# Patient Record
(demographics unavailable — no encounter records)

---

## 2024-10-07 NOTE — HISTORY OF PRESENT ILLNESS
[FreeTextEntry1] : MELONY VAZQUEZ is a 66 year male who presents with luts/ pelvic pain. He complains of pelvic pain, nocturia, weak stream for many years. Mr. VAZQUEZ has been treated with iTIND with limited improvements. + gemtesa.   The patient is sexually active and has erectile dysfunction.  Here for VV. [Urinary Incontinence] : no urinary incontinence [Urinary Retention] : no urinary retention [Urinary Urgency] : no urinary urgency [Urinary Frequency] : urinary frequency [Nocturia] : nocturia [None] : None

## 2024-10-07 NOTE — ASSESSMENT
[FreeTextEntry1] : LUTs/ BPH- cysto/ trus - 55cc, + median lobe. cont current regimen of cialis/ gemtesa. hx of itind. reviewed role of repeat bph procedure. repeat cysto prior to any bph procedure.  pelvic pain - cont pelvic floor pt, trial valium suppositories.  PSA - wnl, 3.11 will monitor.

## 2025-02-13 NOTE — HISTORY OF PRESENT ILLNESS
[FreeTextEntry1] : MELONY VAZQUEZ is a 66 year male history of luts/ pelvic pain. He complains of pelvic pain, nocturia, weak stream for many years. Mr. VAZQUEZ has been treated with iTIND with limited improvements. + gemtesa. MR VAZQUEZ is now s/p optilume (12/2025)  The patient is sexually active and has erectile dysfunction/ pain with erection. MICHELLE-deferred  [Urinary Incontinence] : no urinary incontinence [Urinary Retention] : no urinary retention [Straining] : no straining [Weak Stream] : no weak stream [Intermittency] : no intermittency [Post-Void Dribbling] : no post-void dribbling

## 2025-02-13 NOTE — ASSESSMENT
[FreeTextEntry1] : Uroflow( BPH): low volume 144ml peak 16.3ml/s PVR( BPH) : 2ml    After a discussion of his urologic history and exam, and a review the urological issues,   1.LUTs/ BPH- cysto/ trus - 55cc, + median lobe. hx of itind and optilume- with improvements in flow. Pt still having some frequency and nocturia- pt ordered 5mg cialis daily-side effects reviewed  2.pelvic pain - cont pelvic floor pt  3.PSA - 3.11 (1/2024) - will repeat PSA next visit   4. Erectile Dysfunction-pt defers treatment at this time   The risks and benefits of treatment plan were discussed in detail with the patient who understands and wishes to proceed with plan. More than 50% was spent on counseling/coordination the care of the patient, regarding treatment options

## 2025-03-10 NOTE — ASSESSMENT
[FreeTextEntry1] :  After a discussion of his urologic history and exam, and a review the urological issues,  1.LUTs/ BPH- cysto/ trus - 55cc, + median lobe. hx of itind and optilume- significant improvements in flow. Pt still having some frequency and nocturia- cont 5mg cialis daily-side effects reviewed  2.pelvic pain - cont pelvic floor pt  3.PSA - 3.11 (1/2024) - will repeat PSA next visit  4. Erectile Dysfunction-pt defers treatment at this time  The risks and benefits of treatment plan were discussed in detail with the patient who understands and wishes to proceed with plan. More than 50% was spent on counseling/coordination the care of the patient, regarding treatment options.

## 2025-03-10 NOTE — HISTORY OF PRESENT ILLNESS
[FreeTextEntry1] : MELONY VAZQUEZ is a 66 year male who presents with luts/ pelvic pain. He complains of pelvic pain, nocturia x4, weak stream for many years. Mr. VAZQUEZ has been treated with optilume with limited improvements. + daily cialis   The patient is sexually active and has erectile dysfunction.  Here for VV converted to tc due to technical difficulties. [Urinary Urgency] : urinary urgency [Urinary Frequency] : urinary frequency [Nocturia] : nocturia

## 2025-03-31 NOTE — PHYSICAL EXAM
[General Appearance - Well Developed] : well developed [General Appearance - Well Nourished] : well nourished [] : no respiratory distress [Abdomen Soft] : soft [de-identified] : pt deferred

## 2025-03-31 NOTE — ASSESSMENT
[FreeTextEntry1] : Uroflow(BPH) PVR(BPH)  After a discussion of his urologic history and exam, and a review the urological issues,  1.LUTs/ BPH- cysto/ trus - 55cc, + median lobe. hx of itind and optilume- significant improvements in flow. Pt still having some frequency and nocturia- cont 5mg cialis daily-side effects reviewed  2.pelvic pain - cont pelvic floor pt  3.PSA - 3.11 (1/2024) - will repeat PSA next visit  4. Erectile Dysfunction-pt defers treatment at this time  The risks and benefits of treatment plan were discussed in detail with the patient who understands and wishes to proceed with plan. More than 50% was spent on counseling/coordination the care of the patient, regarding treatment options.

## 2025-03-31 NOTE — HISTORY OF PRESENT ILLNESS
[Urinary Frequency] : urinary frequency [Nocturia] : nocturia [Urinary Urgency] : no urinary urgency [FreeTextEntry1] : CC:BPH  MELONY VAZQUEZ is a 67 year male who presents with luts/ pelvic pain. He complains of pelvic pain, nocturia x4, weak stream for many years. Mr. VAZQUEZ has been treated with optilume with limited improvements. + daily cialis  The patient is sexually active and has erectile dysfunction.

## 2025-03-31 NOTE — PHYSICAL EXAM
[General Appearance - Well Developed] : well developed [General Appearance - Well Nourished] : well nourished [] : no respiratory distress [Abdomen Soft] : soft [de-identified] : pt deferred

## 2025-05-23 NOTE — PHYSICAL EXAM
[Chaperone Present] : A chaperone was present in the examining room during all aspects of the physical examination [FreeTextEntry2] : [place]

## 2025-05-23 NOTE — HISTORY OF PRESENT ILLNESS
[FreeTextEntry1] : Patient presents as referral from Dr. Bolanos.  Patient is a very pleasant 67-year-old male who presents with pelvic pain.  Also history of BPH status post iTIND and Optilume.    Patient's main complaint is that of perineal and penile and some suprapubic pain.  Patient notes nocturia x 6 due to pain with bladder filling.  Interestingly, no significant voiding frequency or pain during the daytime.  Patient feels that his urinary flow rate is adequate and feels that he empties his bladder well.  No significant pain with ejaculation.  Prior urodynamics was unrevealing.  Patient underwent cystoscopic examination which reportedly only showed some bladder trabeculation.  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  The patient has also been on a variety of therapies including pelvic floor physical therapy, alpha blockers, and even muscle relaxants none of which have been particularly helpful.  The patient has undergone pelvic floor injections with steroid without adequate response (although he did expect a long-lasting effect).  There is no history of documented UTI or STI.  PSA from January 2024 was 3.11 ng/cc.  Urinalysis urine cytology and urine cultures have all been negative.  The patient's past medical history past surgical history and review of systems were reviewed today and can be found in the patient's medical record.  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  Physical examination shows the patient to be oriented x 3 neuro grossly intact normal gait  Abdominal exam showed no distention tympany masses or organomegaly.  Bladder was nonpalpable.  No inguinal adenopathy present.  No cutaneous genital lesions were identified.  Testes approximately 20 cc bilaterally normal cord structures no masses.  Perineum mildly tender mainly posteriorly.  No masses induration fluctuance.  Anal sphincter tone appeared to be normal.  Levator plate relatively supple with exact reproduction of pain elicited along course of distal pudendal/perineal nerve.  In summary, patient presents with a complex urological picture with some irritative/obstructive voiding symptoms but most distressing is the pain associate with bladder filling at night.  Currently the patient is getting very little sleep but did note that when using Tylenol when he awakens at night, this tends to improve pain but actually produces some morning fatigue.  We discussed simply taking the medication prophylactically at bedtime.  We also discussed basic techniques of pelvic floor relaxation many of which she is already using.  We discussed possible role for bilateral pudendal/perineal nerve blocks with injections to the pelvic floor musculature, concentrating on any benefit obtained even 3 to 5 hours after injection.  We discussed in great detail the related risks and benefits even permanent upper chelation of pain.  We also discussed the possible role for repeat cystoscopy but in this instance with placement of an anesthetic cocktail and once again the rationale behind this approach (pain with bladder filling that he notes) was discussed.  In the meantime, a trial with Pyridium 200 mg will be tried while he is in [place] next week.  The patient is most interested in proceeding with trigger point injections and we will schedule that at his earliest convenience.

## 2025-06-18 NOTE — HISTORY OF PRESENT ILLNESS
[FreeTextEntry1] : see nerve block worksheet. procedure uneventful. Sig decrease in perineal pain immediatelty after procedure. Patient to keep track of symptoms for next hours/days.Current complaints of dysuria and bladder pain/ poor flow. Suggested moving forward with Cysto and AC instill. Rationale , R/B/A discussed.

## 2025-07-14 NOTE — HISTORY OF PRESENT ILLNESS
[Urinary Frequency] : urinary frequency [Nocturia] : nocturia [Urinary Retention] : urinary retention [Erectile Dysfunction] : Erectile Dysfunction [FreeTextEntry1] : cc: bph/ pelvic pain  MELONY VAZQUEZ is a 67 year male who presents with luts/ pelvic pain. He complains of pelvic pain, nocturia x3 - from 4, weak stream for many years. Mr. VAZQUEZ has been treated with optilume with improvements. Was on daily cialis-now d/c Mr. VAZQUEZ is currently seeing Dr. Rivera for injections with imporvments in pain.  IPSS 14/5   ipss 11/4  The patient is sexually active and has pain with erections. MICHELLE 4  Currently on immunotherapy treatment for melanoma     [Urinary Incontinence] : no urinary incontinence [Urinary Urgency] : no urinary urgency [Dysuria] : no dysuria

## 2025-07-14 NOTE — ASSESSMENT
[FreeTextEntry1] : uroflow (BPH) -slow flow qmax 8.5 pvr (BPH) - 127cc-pt soft nondistended-ER warning signs reviewed  After a discussion of his urologic history and exam, and a review the urological issues,  1.LUTs/ BPH- cysto/ trus - 55cc, + small IPP. hx of itind and optilume- significant improvements in flow. Pt still having some  nocturia- pt self d/c cialis- no change in symptoms. Mainly complains of nocturia. Currently going through immunotherapy deferred any new medications at this time.  2.pelvic pain - cont pelvic floor pt, Improvements in symptoms with injections from Dr. Rivera. Plan for cysto with instillation with Dr. Rivera in August  3.PSA - 3.11 (1/2024) - PSA drawn today will f/u with results  4. Erectile Dysfunction/ penile pain - followed by Dr. Rivera  The risks and benefits of treatment plan were discussed in detail with the patient who understands and wishes to proceed with plan. More than 50% was spent on counseling/coordination the care of the patient, regarding treatment options.  T:45 min

## 2025-07-14 NOTE — PHYSICAL EXAM
[General Appearance - Well Developed] : well developed [General Appearance - Well Nourished] : well nourished [] : no respiratory distress [Abdomen Soft] : soft [Normal Station and Gait] : the gait and station were normal for the patient's age [Skin Color & Pigmentation] : normal skin color and pigmentation [No Focal Deficits] : no focal deficits [de-identified] : pt deferred

## 2025-07-17 NOTE — HISTORY OF PRESENT ILLNESS
[FreeTextEntry1] : Patient had significant improvement in symptoms albeit short-lived with bilateral pudendal nerve blocks and trigger point injections to pelvic floor musculature.  Still with significant nocturia and penile discomfort.  Today cystoscopy performed with results and worksheet.  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  Prostatic urethra showed trilobar enlargement with protruding median lobe over trigone (ball-valve effect?).  Bladder showed 1-2+ trabeculation without inflammatory or mass lesions.  Anesthetic cocktail left in situ and patient will keep track of overall discomfort over the next several hours to days.   As far as nocturia is concerned, we discussed varied further forms of therapy that might be helpful in the patient enumerated the multiple prior therapies that were ineffective, mostly procedures and beta 3 agonist therapy.  We discussed the possible role for nortriptyline especially as he is not currently having significant obstructive voiding symptoms and appears to empty his bladder well.  We discussed associated risks and benefits and elected to do a slow titration up from 10 to 30 mg nightly.  Patient states no cardiac issues/complaints.  With reference to perineal pain, suggested 1 further treatment with nerve block/trigger point injections.  If continues with favorable response but this remains short-lived, we will consider nerve block with Botox injection to the pelvic floor musculature.  The basics of this approach were discussed today with associated risks and benefits and a discussion of its non-FDA approved status in this clinical scenario.  On a final note, the patient's melanoma near his right eye appears to be responding to immunotherapy and for the time being surgery is on hold.